# Patient Record
Sex: MALE | Race: WHITE | NOT HISPANIC OR LATINO | Employment: FULL TIME | URBAN - METROPOLITAN AREA
[De-identification: names, ages, dates, MRNs, and addresses within clinical notes are randomized per-mention and may not be internally consistent; named-entity substitution may affect disease eponyms.]

---

## 2020-10-07 ENCOUNTER — OFFICE VISIT (OUTPATIENT)
Dept: FAMILY MEDICINE | Facility: CLINIC | Age: 35
End: 2020-10-07
Payer: COMMERCIAL

## 2020-10-07 VITALS
WEIGHT: 184 LBS | HEART RATE: 58 BPM | SYSTOLIC BLOOD PRESSURE: 128 MMHG | DIASTOLIC BLOOD PRESSURE: 83 MMHG | TEMPERATURE: 98.4 F | RESPIRATION RATE: 16 BRPM | BODY MASS INDEX: 26.34 KG/M2 | OXYGEN SATURATION: 98 % | HEIGHT: 70 IN

## 2020-10-07 DIAGNOSIS — N50.811 PAIN IN RIGHT TESTICLE: ICD-10-CM

## 2020-10-07 DIAGNOSIS — Z00.00 ROUTINE GENERAL MEDICAL EXAMINATION AT A HEALTH CARE FACILITY: Primary | ICD-10-CM

## 2020-10-07 DIAGNOSIS — K59.00 CONSTIPATION, UNSPECIFIED CONSTIPATION TYPE: ICD-10-CM

## 2020-10-07 PROCEDURE — 90686 IIV4 VACC NO PRSV 0.5 ML IM: CPT | Performed by: PHYSICIAN ASSISTANT

## 2020-10-07 PROCEDURE — 99385 PREV VISIT NEW AGE 18-39: CPT | Mod: 25 | Performed by: PHYSICIAN ASSISTANT

## 2020-10-07 PROCEDURE — 90471 IMMUNIZATION ADMIN: CPT | Performed by: PHYSICIAN ASSISTANT

## 2020-10-07 PROCEDURE — 99213 OFFICE O/P EST LOW 20 MIN: CPT | Mod: 25 | Performed by: PHYSICIAN ASSISTANT

## 2020-10-07 SDOH — HEALTH STABILITY: MENTAL HEALTH: HOW OFTEN DO YOU HAVE 6 OR MORE DRINKS ON ONE OCCASION?: NOT ASKED

## 2020-10-07 SDOH — HEALTH STABILITY: MENTAL HEALTH: HOW MANY STANDARD DRINKS CONTAINING ALCOHOL DO YOU HAVE ON A TYPICAL DAY?: NOT ASKED

## 2020-10-07 SDOH — HEALTH STABILITY: MENTAL HEALTH: HOW OFTEN DO YOU HAVE A DRINK CONTAINING ALCOHOL?: NOT ASKED

## 2020-10-07 ASSESSMENT — MIFFLIN-ST. JEOR: SCORE: 1773.36

## 2020-10-07 NOTE — PROGRESS NOTES
3  SUBJECTIVE:   CC: Manpreet Lutz is an 35 year old male who presents for preventive health visit.       Patient has been advised of split billing requirements and indicates understanding: Yes  Healthy Habits:    Do you get at least three servings of calcium containing foods daily (dairy, green leafy vegetables, etc.)? yes    Amount of exercise or daily activities, outside of work: 5-6 day(s) per week, bikes often    Problems taking medications regularly not applicable    Medication side effects: No    Have you had an eye exam in the past two years? yes    Do you see a dentist twice per year? yes    Do you have sleep apnea, excessive snoring or daytime drowsiness?no    *  Right testicular pain off and on over the last 7 months  *  Constipation, has been using miralax daily  *  Will do flu shot, labs patient is not fasting     Has had issues with constipation for a long time, strains, small amounts of stool or sometimes loose  Started miralax for a couple weeks, made a huge difference  Not sure what to do next with miralax    Testicular pain: tender to touch sometimes  No urinary issues, ED issues, discharge, lump that he knows of  Not positional      Today's PHQ-2 Score:   PHQ-2 ( 1999 [employer]) 10/7/2020   Q1: Little interest or pleasure in doing things 0   Q2: Feeling down, depressed or hopeless 0   PHQ-2 Score 0       Abuse: Current or Past(Physical, Sexual or Emotional)- No  Do you feel safe in your environment? Yes        Social History     Tobacco Use     Smoking status: Light Tobacco Smoker     Types: Cigarettes     Smokeless tobacco: Never Used     Tobacco comment: 2 cigs daily - stress. for the past year. smoked a few years when younger and quit   Substance Use Topics     Alcohol use: Yes     Comment: occasioanlly     If you drink alcohol do you typically have >3 drinks per day or >7 drinks per week? No                      Last PSA: No results found for: PSA    Reviewed orders with patient.  Reviewed health maintenance and updated orders accordingly - Yes  Labs reviewed in EPIC  BP Readings from Last 3 Encounters:   10/07/20 128/83    Wt Readings from Last 3 Encounters:   10/07/20 83.5 kg (184 lb)                  Patient Active Problem List   Diagnosis   (none) - all problems resolved or deleted     Past Surgical History:   Procedure Laterality Date     SURGICAL HISTORY OF -   11/28/2000    Appendectomy       Social History     Tobacco Use     Smoking status: Light Tobacco Smoker     Types: Cigarettes     Smokeless tobacco: Never Used     Tobacco comment: 2 cigs daily - stress. for the past year. smoked a few years when younger and quit   Substance Use Topics     Alcohol use: Yes     Comment: occasioanlly     Family History   Problem Relation Age of Onset     Coronary Artery Disease Father 55        angioplasty     Heart Disease Paternal Grandfather      Cancer No family hx of      Diabetes No family hx of            Reviewed and updated as needed this visit by clinical staff  Tobacco  Allergies  Meds  Problems  Med Hx  Surg Hx  Fam Hx  Soc Hx          Reviewed and updated as needed this visit by Provider  Tobacco   Meds  Problems  Med Hx  Surg Hx  Fam Hx         History reviewed. No pertinent past medical history.   Past Surgical History:   Procedure Laterality Date     SURGICAL HISTORY OF -   11/28/2000    Appendectomy       ROS:  CONSTITUTIONAL: NEGATIVE for fever, chills, change in weight  INTEGUMENTARY/SKIN: NEGATIVE for worrisome rashes, moles or lesions  EYES: NEGATIVE for vision changes or irritation  ENT: NEGATIVE for ear, mouth and throat problems  RESP: NEGATIVE for significant cough or SOB  CV: NEGATIVE for chest pain, palpitations or peripheral edema  GI: NEGATIVE for nausea, abdominal pain, heartburn, or change in bowel habits   male: negative for dysuria, hematuria, decreased urinary stream, erectile dysfunction, urethral discharge  MUSCULOSKELETAL: NEGATIVE for  "significant arthralgias or myalgia  NEURO: NEGATIVE for weakness, dizziness or paresthesias  PSYCHIATRIC: NEGATIVE for changes in mood or affect    OBJECTIVE:   /83   Pulse 58   Temp 98.4  F (36.9  C) (Tympanic)   Resp 16   Ht 1.79 m (5' 10.47\")   Wt 83.5 kg (184 lb)   SpO2 98%   BMI 26.05 kg/m    EXAM:  GENERAL: healthy, alert and no distress  EYES: Eyes grossly normal to inspection, PERRL and conjunctivae and sclerae normal  HENT: ear canals and TM's normal, nose and mouth without ulcers or lesions  NECK: no adenopathy, no asymmetry, masses, or scars and thyroid normal to palpation  RESP: lungs clear to auscultation - no rales, rhonchi or wheezes  CV: regular rate and rhythm, normal S1 S2, no S3 or S4, no murmur, click or rub, no peripheral edema and peripheral pulses strong  ABDOMEN: soft, nontender, no hepatosplenomegaly, no masses and bowel sounds normal   (male): normal male genitalia without lesions or urethral discharge, no hernia   (male): POSITIVE right testicle shows small mobile cyst like structure, maybe pea sized on the testicle, this is the tender area  MS: no gross musculoskeletal defects noted, no edema  SKIN: no suspicious lesions or rashes  NEURO: Normal strength and tone, mentation intact and speech normal  BACK: no CVA tenderness, no paralumbar tenderness  PSYCH: mentation appears normal, affect normal/bright  LYMPH: no cervical, supraclavicular, axillary, or inguinal adenopathy    Diagnostic Test Results:  Labs reviewed in Epic    ASSESSMENT/PLAN:     ASSESSMENT/PLAN:      ICD-10-CM    1. Routine general medical examination at a health care facility  Z00.00 INFLUENZA VACCINE IM > 6 MONTHS VALENT IIV4 [67307]     Lipid panel reflex to direct LDL Fasting     Glucose     **HIV Antigen Antibody Combo FUTURE anytime   2. Pain in right testicle  N50.811 US Testicular & Scrotum w Doppler Ltd   3. Constipation, unspecified constipation type  K59.00      We discussed if ultrasound does " "not show concern, monitor symptoms, consider pelvic floor physical therapy or urology consult if needed.  Constipation: if worsening, consider further evaluation. Discussed symptomatic measures, reasons for this.    Patient Instructions   Testicular ultrasound: For [facility]) imaging departments: call [phone] to schedule     Fasting labwork    Constipation:   For constipation:   1. Consume at least 8 glasses of water per day   2. Exercise for at least 30 minutes every day. Regular exercise helps to keep your digestive system active and and healthy and may help with constipation.   3. Increase dietary fiber. Goal of 25 grams per day for women, 35 grams per day for men. If unable to consume 25-35 grams through diet alone consider OTC supplements such as Benefiber, FiberCon, Metamucil, or Citrucel.   4. Recommend taking Miralax (17 grams = 1 scoop). Take once daily, can mix with anything. If you experience increasing bowel movements and diarrhea, decrease to every other day or every 3rd day. Miralax is an osmotic laxative that increases the amount of water secreted by the intestines resulting in softer and easier to pass stools.   Can use this as needed      Patient has been advised of split billing requirements and indicates understanding: Yes  COUNSELING:  Reviewed preventive health counseling, as reflected in patient instructions       Regular exercise       Healthy diet/nutrition       HIV screeninx in teen years, 1x in adult years, and at intervals if high risk    Estimated body mass index is 26.05 kg/m  as calculated from the following:    Height as of this encounter: 1.79 m (5' 10.47\").    Weight as of this encounter: 83.5 kg (184 lb).    Weight management plan: Discussed healthy diet and exercise guidelines    He reports that he has been smoking cigarettes. He has never used smokeless tobacco.  Not ready to quit    Counseling Resources:  ATP IV Guidelines  Pooled Cohorts " Equation Calculator  FRAX Risk Assessment  ICSI Preventive Guidelines  Dietary Guidelines for Americans, 2010  USDA's MyPlate  ASA Prophylaxis  Lung CA Screening    EDILIA Llanes [facility]

## 2020-10-07 NOTE — PROGRESS NOTES
SUBJECTIVE:                                                    Manpreet Lutz is a 35 year old male who presents to clinic today for the following health issues:    {Superlists:200467}    Problem list and histories reviewed & adjusted, as indicated.  Additional history: ***    {HIST REVIEW/ LINKS 2:328908}    ROS:  {ROS COMP:117703}    OBJECTIVE:                                                    There were no vitals taken for this visit. There is no height or weight on file to calculate BMI.   {EXAM CHOICES:098058}       ASSESSMENT/PLAN:                                                      No diagnosis found.   has no history on file for tobacco.  {Tobacco Cessation needed for ACO -- Delete if patient is a non-smoker:770142}    {Weight Management Plan needed for ACO:373562}    ***  [facility]

## 2020-10-07 NOTE — PATIENT INSTRUCTIONS
Testicular ultrasound: For [facility] ([place], [place], [facility]) imaging departments: call [phone] to schedule     Fasting labwork    Constipation:   For constipation:   1. Consume at least 8 glasses of water per day   2. Exercise for at least 30 minutes every day. Regular exercise helps to keep your digestive system active and and healthy and may help with constipation.   3. Increase dietary fiber. Goal of 25 grams per day for women, 35 grams per day for men. If unable to consume 25-35 grams through diet alone consider OTC supplements such as Benefiber, FiberCon, Metamucil, or Citrucel.   4. Recommend taking Miralax (17 grams = 1 scoop). Take once daily, can mix with anything. If you experience increasing bowel movements and diarrhea, decrease to every other day or every 3rd day. Miralax is an osmotic laxative that increases the amount of water secreted by the intestines resulting in softer and easier to pass stools.   Can use this as needed    Preventive Health Recommendations  Male Ages 26 - 39    Yearly exam:             See your health care provider every year in order to  o   Review health changes.   o   Discuss preventive care.    o   Review your medicines if your doctor has prescribed any.    You should be tested each year for STDs (sexually transmitted diseases), if you re at risk.     After age 35, talk to your provider about cholesterol testing. If you are at risk for heart disease, have your cholesterol tested at least every 5 years.     If you are at risk for diabetes, you should have a diabetes test (fasting glucose).  Shots: Get a flu shot each year. Get a tetanus shot every 10 years.     Nutrition:    Eat at least 5 servings of fruits and vegetables daily.     Eat whole-grain bread, whole-wheat pasta and brown rice instead of white grains and rice.     Get adequate Calcium and Vitamin D.     Lifestyle    Exercise for at least 150 minutes a week (30 minutes a day, 5 days a week). This will  help you control your weight and prevent disease.     Limit alcohol to one drink per day.     No smoking.     Wear sunscreen to prevent skin cancer.     See your dentist every six months for an exam and cleaning.

## 2021-04-14 ENCOUNTER — OFFICE VISIT (OUTPATIENT)
Dept: FAMILY MEDICINE | Facility: CLINIC | Age: 36
End: 2021-04-14
Payer: COMMERCIAL

## 2021-04-14 ENCOUNTER — ANCILLARY PROCEDURE (OUTPATIENT)
Dept: GENERAL RADIOLOGY | Facility: CLINIC | Age: 36
End: 2021-04-14
Attending: PHYSICIAN ASSISTANT
Payer: COMMERCIAL

## 2021-04-14 VITALS
BODY MASS INDEX: 27.39 KG/M2 | SYSTOLIC BLOOD PRESSURE: 116 MMHG | DIASTOLIC BLOOD PRESSURE: 70 MMHG | WEIGHT: 191.3 LBS | HEIGHT: 70 IN | OXYGEN SATURATION: 98 % | HEART RATE: 67 BPM | TEMPERATURE: 98.3 F | RESPIRATION RATE: 15 BRPM

## 2021-04-14 DIAGNOSIS — S93.401A SPRAIN OF RIGHT ANKLE, UNSPECIFIED LIGAMENT, INITIAL ENCOUNTER: Primary | ICD-10-CM

## 2021-04-14 DIAGNOSIS — M79.671 RIGHT FOOT PAIN: ICD-10-CM

## 2021-04-14 DIAGNOSIS — S93.401A SPRAIN OF RIGHT ANKLE, UNSPECIFIED LIGAMENT, INITIAL ENCOUNTER: ICD-10-CM

## 2021-04-14 PROCEDURE — 73630 X-RAY EXAM OF FOOT: CPT | Mod: RT | Performed by: RADIOLOGY

## 2021-04-14 PROCEDURE — 73610 X-RAY EXAM OF ANKLE: CPT | Mod: RT | Performed by: RADIOLOGY

## 2021-04-14 PROCEDURE — 99213 OFFICE O/P EST LOW 20 MIN: CPT | Performed by: PHYSICIAN ASSISTANT

## 2021-04-14 ASSESSMENT — MIFFLIN-ST. JEOR: SCORE: 1801.47

## 2021-04-14 NOTE — PROGRESS NOTES
Assessment & Plan     ASSESSMENT/PLAN:      ICD-10-CM    1. Sprain of right ankle, unspecified ligament, initial encounter  S93.401A XR Ankle Right G/E 3 Views     [facility]   2. Right foot pain  M79.671 XR Foot Right G/E 3 Views     [facility]     Ankle stretches handouts.  Recommended start with physical therapy  Consider MRI/ortho follow up as needed.    Return in about 5 days (around 4/19/2021) for PT.    EDILIA Llanes [facility] E[facility] Gil is a 36 year old who presents for the following health issues     HPI   Chief Complaint   Patient presents with     Ankle Pain     right ankle sprain 3 months ago and still bothering him, states that recently it has been progressively getting worse. States that he sometimes slips down his stairs. No known injury that he is aware of that happened it has just been aggravated over time       Musculoskeletal problem/pain  Onset/Duration: Reoccurring ankle sprain, right foot pain  Description  Location: Right ankle - right  Joint Swelling: YES  Redness: no  Pain: YES  Warmth: no  Intensity:  moderate  Progression of Symptoms:  worsening  Accompanying signs and symptoms:   Fevers: no  Numbness/tingling/weakness: YES, weakness  History  Trauma to the area: None that he is aware of  Recent illness:  no  Previous similar problem: no  Previous evaluation:  no  Precipitating or alleviating factors:  Aggravating factors include: standing, walking and overuse  Therapies tried and outcome: rest/inactivity, heat, ice and Ibuprofen with some relief        About 3 months ago noticed pain on lateral foot, more when he put ski boot or hockey skates on. He slipped down slick stairs a couple times. He was also a runner at the time. Has not run for a couple months because of the pain.  Then 2 months ago he was shoveling driveway and twisted ankle, reaggravated previous soccer injuries. Over the years if he walks excessively  "etc he has ankle soreness. Has been a couple years since he had a bad sprain before now.  Pain primarily medial, anterior ankles, posterior  He would like xray       Review of Systems   Other than noted above, general, HEENT, respiratory, cardiac, MS, and gastrointestinal systems are negative.       Objective    /70   Pulse 67   Temp 98.3  F (36.8  C) (Tympanic)   Resp 15   Ht 1.79 m (5' 10.47\")   Wt 86.8 kg (191 lb 4.8 oz)   SpO2 98%   BMI 27.08 kg/m    Body mass index is 27.08 kg/m .  Physical Exam   GENERAL APPEARANCE: healthy, alert and no distress  MS: extremities normal- no gross deformities noted  ORTHO: Ankle Exam:   Knee:normal appearance  Lower leg:normal appearance, normal on palpation    ANKLE  Inspection:Swelling:none   Tender:no real TTP. Patient notes pain medial heel, medial ankle ligaments, generalized anterior heel/anterior tibialis area  Non-tender:no bony tenderness  Range of Motion:dorsiflexion:  full, plantarflexion:  full, inversion:  decreased, eversion:  full  Strength:full  Special tests:negative anterior drawer, negative talar tilt, negative valgus stress    FOOT  foot exam : Inspection Palpation:   Swelling: no swelling  Tender: no TTP. Patient notes pain around 4th metatarsal area  Non-tender:proximal 5th metatarsal, midshaft 5th metatarsal  Range of Motion:flexion of toes:  full, extension of toes  full      Results for orders placed or performed in visit on 04/14/21   XR Foot Right G/E 3 Views     Status: None    Narrative    XR ANKLE RT G/E 3 VW, XR FOOT RT G/E 3 VW   4/14/2021 10:36 AM     HISTORY:  3 months ago injury, recurrent injuries, pain medial side of  ankle/anterior ankle; Sprain of right ankle, unspecified ligament,  initial encounter      Impression    IMPRESSION: Unremarkable exam.    RADHA TOBAR MD   Results for orders placed or performed in visit on 04/14/21   XR Ankle Right G/E 3 Views     Status: None    Narrative    XR ANKLE RT G/E 3 VW, XR FOOT RT G/E 3 " VW   4/14/2021 10:36 AM     HISTORY:  3 months ago injury, recurrent injuries, pain medial side of  ankle/anterior ankle; Sprain of right ankle, unspecified ligament,  initial encounter      Impression    IMPRESSION: Unremarkable exam.    RADHA TOBAR MD

## 2021-04-14 NOTE — PATIENT INSTRUCTIONS
Patient Education     Foot and Ankle Exercises: Ankle Circles    This exercise is designed to stretch and strengthen your feet and ankles by using range of motion. Before beginning the exercise, read through all the instructions. While exercising, breathe normally. If you feel any pain, stop the exercise. If pain continues, tell your healthcare provider.     Sit straight-legged on the floor or other firm surface.    Resting your ______ calf on a rolled-up towel, use your foot to draw circles in both directions. Or leading with your big toe, write the letters of the alphabet in the air.    Continue for ______ seconds. Do ______ times a day.  [employer] last reviewed this educational content on 4/1/2020 2000-2021 [employer]. All rights reserved. This information is not intended as a substitute for professional medical care. Always follow your healthcare professional's instructions.           Patient Education     Bent-Knee Calf Stretch    This exercise is designed to stretch and strengthen your feet and ankles. Before beginning the exercise, read through all the instructions. While exercising, breathe normally and don t bounce. If you feel any pain, stop the exercise. If pain persists, inform your healthcare provider:    Stand an arm s length away from a wall. Place the palms of your hands on the wall. Step forward about 12 inches with your ______ foot.    Keeping toes pointed forward and both heels on the floor, bend both knees and lean forward. Hold for ______ seconds. Relax.    Repeat ______ times. Do ______ sets a day.  [employer] last reviewed this educational content on 3/1/2018    [id number] [employer]. All rights reserved. This information is not intended as a substitute for professional medical care. Always follow your healthcare professional's instructions.           Patient Education     Foot and Ankle Exercises: Single-Leg Heel Raise    This exercise is designed to stretch and  strengthen your feet and ankles. Before beginning the exercise, read through all the instructions. While exercising, breathe normally and don t bounce. If you feel any pain, stop the exercise. If pain persists, inform your healthcare provider. If you recently had joint surgery, talk with your provider before doing this exercise. Here are the steps to the single-leg heel raise:     Stand, using something sturdy like a wall or a counter for balance only. Lift 1 foot and stand with your weight on the other foot and your knee straight.    Rise up on your toes, hold for 5 seconds, and then slowly lower back onto your heel.    Repeat 10 times. Do 3 sets a day.  [facility] last reviewed this educational content on 6/1/2020 2000-2021 [employer]. All rights reserved. This information is not intended as a substitute for professional medical care. Always follow your healthcare professional's instructions.           Patient Education     Ankle Dorsiflexion (Strength)                                   The steps below are for your right ankle. Switch sides for your left ankle.  1. Tie an elastic exercise band or tubing to the bottom part of a table. Tie the other end to your right foot.  2. Sit on the floor with your right leg straight. Sit far enough away from the table so that the elastic band or tubing is pulled tight between your foot and the table leg.  3. Slowly flex your right foot and pull your toes back toward you. Keep your right leg straight. Hold for 5 seconds.  4. Relax your foot.  5. Repeat this exercise 10 times.  [facility] last reviewed this educational content on 6/1/2019 2000-2021 [employer]. All rights reserved. This information is not intended as a substitute for professional medical care. Always follow your healthcare professional's instructions.           Patient Education     Ankle Dorsiflexion/Plantarflexion (Flexibility)    6. Sit on the floor or in bed with your legs  straight out in front of you.  7. Point both feet. Then flex both feet.  8. Do this 10 to 30 times in a row.  9. Repeat this exercise 2 times a day, or as instructed.  [employer] last reviewed this educational content on 6/1/2019 2000-2021 [employer]. All rights reserved. This information is not intended as a substitute for professional medical care. Always follow your healthcare professional's instructions.           Patient Education     Ankle Alphabet (Flexibility)    These instructions are for your right foot. Switch sides for your left foot.  10. Sit on the floor with your legs straight in front of you.  11. Rest your right calf on a rolled-up towel. Use your foot to write the letters of the alphabet in mid-air.  12. Repeat this exercise 3 times a day, or as instructed.  [employer] last reviewed this educational content on 2/1/2020 2000-2021 [employer]. All rights reserved. This information is not intended as a substitute for professional medical care. Always follow your healthcare professional's instructions.

## 2021-04-19 ENCOUNTER — THERAPY VISIT (OUTPATIENT)
Dept: PHYSICAL THERAPY | Facility: CLINIC | Age: 36
End: 2021-04-19
Payer: COMMERCIAL

## 2021-04-19 DIAGNOSIS — S93.401A SPRAIN OF RIGHT ANKLE, UNSPECIFIED LIGAMENT, INITIAL ENCOUNTER: ICD-10-CM

## 2021-04-19 DIAGNOSIS — M79.671 RIGHT FOOT PAIN: ICD-10-CM

## 2021-04-19 PROCEDURE — 97110 THERAPEUTIC EXERCISES: CPT | Mod: GP | Performed by: PHYSICAL THERAPIST

## 2021-04-19 PROCEDURE — 97112 NEUROMUSCULAR REEDUCATION: CPT | Mod: GP | Performed by: PHYSICAL THERAPIST

## 2021-04-19 PROCEDURE — 97161 PT EVAL LOW COMPLEX 20 MIN: CPT | Mod: GP | Performed by: PHYSICAL THERAPIST

## 2021-04-20 NOTE — PROGRESS NOTES
Physical Therapy Initial Evaluation  Subjective:    Patient Health History  Manpreet Lutz being seen for Ankle injury.     Problem began: 1/20/2021.   Problem occurred: Not known   Pain is reported as 3/10 on pain scale.  General health as reported by patient is good.       Medical allergies: none.   Surgeries include:  Other. Other surgery history details: Appendix removal.    Current medications:  None.    Current occupation is .   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                  Therapist Generated HPI Evaluation  Problem details: Jeffery reports an old sprain medial right ankle that has cause recurring pain but over the past few months has also had lateral foot pain, worse with being in ski boot or skate, and not feeling like he is able to run without ankle pain..         Type of problem:  Right ankle and right foot.    This is a recurrent condition.  Condition occurred with:  Insidious onset.  Where condition occurred: during recreation/sport and for unknown reasons.  Patient reports pain:  Medial and other (lateral foot).  Pain is described as aching and is intermittent.  Pain radiates to:  No radiation. Pain is worse during the day.  Since onset symptoms are gradually worsening.  Associated symptoms:  Loss of motion/stiffness and loss of strength. Symptoms are exacerbated by running, certain positions and bending/squatting    Special tests included:  X-ray.                            Objective:  System    Ankle/Foot Evaluation  ROM:  AROM is normal (Right ankle active inv/ev feels more difficutl to do than left).PROM is normal.    PROM:                Pain: mild anterior pain with weight bearing dorsiflexion motion    Strength:    Dorsiflexion:  Right: 5/5   Pain:  Plantarflexion: Right: 5/5  Pain:  Inversion:Right: 5/5  Pain:  Eversion:Right: 5/5  Pain:                      PALPATION:     Right ankle tenderness present at:    "deltoid ligament      FUNCTIONAL TESTS:   Anterior-Lateral Reach: Left Leg/Right Arm: 2'6\"     Right Leg/Left Arm: 2'6\"        Quad:  Single Leg Squat Left: 4\" step down  Control is normal control.  Single Leg Squat Right: 4\" step down Control is hip substitution      Proprioception:  Stork Balance Test: Left: 30 sec good  Right: 30 sec good, a little more unstable than left on compliant surface                                                     General     ROS    Assessment/Plan:    Patient is a 36 year old male with right side ankle complaints.    Patient has the following significant findings with corresponding treatment plan.                Diagnosis 1:  Ankle pain  Pain -  manual therapy  Decreased joint mobility - manual therapy and therapeutic exercise  Decreased proprioception - neuro re-education and therapeutic activities    Therapy Evaluation Codes:   1) History comprised of:   Personal factors that impact the plan of care:      None.    Comorbidity factors that impact the plan of care are:      None.     Medications impacting care: None.  2) Examination of Body Systems comprised of:   Body structures and functions that impact the plan of care:      Ankle.   Activity limitations that impact the plan of care are:      Running, Sports and Squatting/kneeling.  3) Clinical presentation characteristics are:   Evolving/Changing.  4) Decision-Making    Low complexity using standardized patient assessment instrument and/or measureable assessment of functional outcome.  Cumulative Therapy Evaluation is: Low complexity.    Previous and current functional limitations:  (See Goal Flow Sheet for this information)    Short term and Long term goals: (See Goal Flow Sheet for this information)     Communication ability:  Patient appears to be able to clearly communicate and understand verbal and written communication and follow directions correctly.  Treatment Explanation - The following has been discussed with the " patient:   RX ordered/plan of care  Anticipated outcomes  Possible risks and side effects  This patient would benefit from PT intervention to resume normal activities.   Rehab potential is excellent.    Frequency:  1 X week, once daily  Duration:  for 6 weeks  Discharge Plan:  Achieve all LTG.  Independent in home treatment program.  Reach maximal therapeutic benefit.    Please refer to the daily flowsheet for treatment today, total treatment time and time spent performing 1:1 timed codes.

## 2021-04-30 ENCOUNTER — THERAPY VISIT (OUTPATIENT)
Dept: PHYSICAL THERAPY | Facility: CLINIC | Age: 36
End: 2021-04-30
Payer: COMMERCIAL

## 2021-04-30 DIAGNOSIS — S93.401A SPRAIN OF RIGHT ANKLE, UNSPECIFIED LIGAMENT, INITIAL ENCOUNTER: Primary | ICD-10-CM

## 2021-04-30 DIAGNOSIS — M79.671 RIGHT FOOT PAIN: ICD-10-CM

## 2021-04-30 PROCEDURE — 97140 MANUAL THERAPY 1/> REGIONS: CPT | Mod: GP | Performed by: PHYSICAL THERAPIST

## 2021-04-30 PROCEDURE — 97110 THERAPEUTIC EXERCISES: CPT | Mod: GP | Performed by: PHYSICAL THERAPIST

## 2021-04-30 PROCEDURE — 97112 NEUROMUSCULAR REEDUCATION: CPT | Mod: GP | Performed by: PHYSICAL THERAPIST

## 2021-07-21 ENCOUNTER — OFFICE VISIT (OUTPATIENT)
Dept: URGENT CARE | Facility: URGENT CARE | Age: 36
End: 2021-07-21
Payer: COMMERCIAL

## 2021-07-21 VITALS
RESPIRATION RATE: 16 BRPM | SYSTOLIC BLOOD PRESSURE: 121 MMHG | BODY MASS INDEX: 26.95 KG/M2 | OXYGEN SATURATION: 98 % | WEIGHT: 190.4 LBS | DIASTOLIC BLOOD PRESSURE: 65 MMHG | HEART RATE: 50 BPM | TEMPERATURE: 97.4 F
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
## 2021-07-21 DIAGNOSIS — R21 RASH AND NONSPECIFIC SKIN ERUPTION: ICD-10-CM

## 2021-07-21 DIAGNOSIS — S90.861A TICK BITE OF FOOT, RIGHT, INITIAL ENCOUNTER: Primary | ICD-10-CM

## 2021-07-21 PROCEDURE — 99213 OFFICE O/P EST LOW 20 MIN: CPT | Performed by: PHYSICIAN ASSISTANT

## 2021-07-21 RX ORDER — DOXYCYCLINE 100 MG/1
100 CAPSULE ORAL 2 TIMES DAILY WITH MEALS
Qty: 42 CAPSULE | Refills: 0 | Status: SHIPPED | OUTPATIENT
Start: 2021-07-21 | End: 2021-08-11

## 2021-07-21 ASSESSMENT — ENCOUNTER SYMPTOMS
COLOR CHANGE: 1
CARDIOVASCULAR NEGATIVE: 1
FATIGUE: 0
WOUND: 0
COUGH: 0
PALPITATIONS: 0
FEVER: 0
RESPIRATORY NEGATIVE: 1
CHILLS: 0
SHORTNESS OF BREATH: 0
CHEST TIGHTNESS: 0
RHINORRHEA: 0
SORE THROAT: 0
WHEEZING: 0

## 2021-07-21 ASSESSMENT — PAIN SCALES - GENERAL: PAINLEVEL: NO PAIN (0)

## 2021-07-21 NOTE — PROGRESS NOTES
Subjective   Jeffery is a 36 year old who presents for the following health issues   HPI   Concern - tick bite  Onset: 2days   Description: Noticed tick bite over the top of the R foot yesterday which he had removed and now has a circular ring present with bruising.  No pain or itching.   Intensity: mild  Progression of Symptoms:  same  Accompanying Signs & Symptoms:  No flu like symptoms, bodyaches, fatigue or fevers.  No new soaps/lotions/detergent, no new medications or foods.  No one else in the family with similar rashes.   Previous history of similar problem: none  Precipitating factors:        Worsened by: none  Alleviating factors:        Improved by: none  Therapies tried and outcome: removal of tick with good relief    Patient Active Problem List   Diagnosis   (none) - all problems resolved or deleted     No current outpatient medications on file.     No current facility-administered medications for this visit.      No Known Allergies    Review of Systems   Constitutional: Negative for chills, fatigue and fever.   HENT: Negative.  Negative for congestion, ear pain, rhinorrhea and sore throat.    Respiratory: Negative.  Negative for cough, chest tightness, shortness of breath and wheezing.    Cardiovascular: Negative.  Negative for chest pain, palpitations and peripheral edema.   Skin: Positive for color change and rash. Negative for pallor and wound.   All other systems reviewed and are negative.           Objective    /65   Pulse 50   Temp 97.4  F (36.3  C) (Tympanic)   Resp 16   Wt 86.4 kg (190 lb 6.4 oz)   SpO2 98%   BMI 26.95 kg/m    Body mass index is 26.95 kg/m .  Physical Exam  Vitals and nursing note reviewed.   Constitutional:       General: He is not in acute distress.     Appearance: Normal appearance. He is normal weight. He is not ill-appearing.   Skin:     General: Skin is warm and dry.      Capillary Refill: Capillary refill takes less than 2 seconds.      Findings: Bruising and  lesion (hyperpigmented annular lesion with bruising present measuring 1.5cm X1.5cm present over the dorsal R foot.  no tenderness, erythema or discharge present.   ) present. No abrasion, abscess, erythema, petechiae, rash or wound.   Neurological:      Mental Status: He is alert and oriented to person, place, and time.   Psychiatric:         Mood and Affect: Mood normal.         Behavior: Behavior normal.         Thought Content: Thought content normal.         Judgment: Judgment normal.            Assessment/Plan:  Tick bite of foot, right, initial encounter:  Will empirically treat for lymes with doxycycline [id number] due to presence of a rash.  Early infection may produce false negative lymes test.  Discussed risks and benefits of medication along with side effects, direction for use, and sun sensitivity and sunburns.  Recommend prevention with proper clothing and bug spray with deet.  Recheck in clinic if he develops flu like symptoms, fatigue, bodyaches, stiff neck, palpitations, dizziness.   -     doxycycline monohydrate (MONODOX) 100 MG capsule; Take 1 capsule (100 mg) by mouth 2 times daily (with meals) for 21 days Increases risk of heartburn and also sun sensitivity or sun burn. Contraindicated in pregnancy.    Rash and nonspecific skin eruption        Ramandeep Daniels PA-C

## 2021-08-09 ENCOUNTER — OFFICE VISIT (OUTPATIENT)
Dept: FAMILY MEDICINE | Facility: CLINIC | Age: 36
End: 2021-08-09
Payer: COMMERCIAL

## 2021-08-09 VITALS
HEART RATE: 53 BPM | SYSTOLIC BLOOD PRESSURE: 125 MMHG | BODY MASS INDEX: 26.87 KG/M2 | HEIGHT: 70 IN | RESPIRATION RATE: 16 BRPM | TEMPERATURE: 97 F | DIASTOLIC BLOOD PRESSURE: 79 MMHG | WEIGHT: 187.7 LBS

## 2021-08-09 DIAGNOSIS — Z30.2 ENCOUNTER FOR STERILIZATION: Primary | ICD-10-CM

## 2021-08-09 PROCEDURE — 99212 OFFICE O/P EST SF 10 MIN: CPT | Performed by: FAMILY MEDICINE

## 2021-08-09 ASSESSMENT — MIFFLIN-ST. JEOR: SCORE: 1785.11

## 2021-08-09 ASSESSMENT — PAIN SCALES - GENERAL: PAINLEVEL: NO PAIN (0)

## 2021-08-09 NOTE — PROGRESS NOTES
"SUBJECTIVE:                                                    Manpreet Lutz is a 36 year old male who presents to clinic today for the following health issues:    Chief Complaint   Patient presents with     Consult     for vasectomy     Problem list and histories reviewed & adjusted, as indicated.  Additional history:     Patient Active Problem List   Diagnosis   (none) - all problems resolved or deleted     Past Surgical History:   Procedure Laterality Date     SURGICAL HISTORY OF -   11/28/2000    Appendectomy       Social History     Tobacco Use     Smoking status: Light Tobacco Smoker     Types: Cigarettes     Smokeless tobacco: Never Used     Tobacco comment: 2 cigs daily - stress. for the past year. smoked a few years when younger and quit   Substance Use Topics     Alcohol use: Yes     Comment: occasioanlly     Family History   Problem Relation Age of Onset     Coronary Artery Disease Father 55        angioplasty     Heart Disease Paternal Grandfather      Cancer No family hx of      Diabetes No family hx of            ROS:  Constitutional, HEENT, cardiovascular, pulmonary, gi and gu systems are negative, except as otherwise noted.    OBJECTIVE:                                                    /79   Pulse 53   Temp 97  F (36.1  C) (Tympanic)   Resp 16   Ht 1.79 m (5' 10.47\")   Wt 85.1 kg (187 lb 11.2 oz)   BMI 26.57 kg/m   Body mass index is 26.57 kg/m .   GENERAL: healthy, alert, well nourished, well hydrated, no distress  SUBJECTIVE:  maurice comes in for a vasectomy consultation.  He and his partner have decided they don't want to have any more children. They have decided that he will have the sterilization procedure instead of her.  Patient was given information to read prior to the consultation.      We talked about the risks and benefits of the vasectomy; risks being that of potential for bleeding, infection, postoperative discomfort immediately which can last for a number of weeks " afterwards, also the development of spermatoceles or sperm granulomas, epididymal cysts and the possibility of failure of the procedure producing failed attempt at sterility.     Also mentioned to the patient that there is some literature out there that suggests men who have vasectomies are at increased risk for prostate cancer; however, this literature is inconclusive and controversial.  It is recommended that men who have vasectomies should have annual prostate exams through the rectum yearly after age 40 and also may benefit from a screening prostatic specific antigen test after age 40.  We also discussed signs and symptoms of prostatic enlargement or prostatic problems.     I went through the procedure with the patient, how it is done, a midline incision in the scrotum measuring approximately 1/2 inch in length.  The vas deferens is brought up through this incision, dissected free and a small portion, maybe 0.5 cm. in length is removed.   cauterized and then the proximal or distal end is buried away from the other.  Patient had no questions when it came to the procedure itself.  I did show him pictures and diagrams of the procedure.  I showed him where a potential spermatocele or sperm granuloma can occur.      Again, the patient had no further questions for me.    OBJECTIVE:  On exam, this is a well-developed, well-nourished white male.      Exam reveals a normal circumcised penis, no lesions.  Testes are descended bilaterally.  Exam was limited to the genitalia.  Both vas deferens were easily identified.  No other abnormalities were noted.      ASSESSMENT:  Undesired fertility in an adult male, requesting vasectomy.    PLAN:  He will schedule a vasectomy at his convenience for either the last appointment of the morning He is aware that he will need to take the entire weekend off and have essentially bedrest for two days with ice packs every two hours and ibuprofen for discomfort.  I gave him a prescription for  ibuprofen 800 mg. to take every six to eight hours with food after the procedure.  He will take one tablet half an hour before the procedure along with Valium 10 mg.    If he has any further questions, he will contact me prior to the procedure or ask me on the day of the procedure.  He also is aware that he will need to bring a specimen after 10-12 ejaculations to make sure that he has cleared the storage vesicles of any residual sperm and to make sure that he is sterile.          [facility]

## 2021-09-12 ENCOUNTER — HEALTH MAINTENANCE LETTER (OUTPATIENT)
Age: 36
End: 2021-09-12

## 2021-09-13 ENCOUNTER — TELEPHONE (OUTPATIENT)
Dept: FAMILY MEDICINE | Facility: CLINIC | Age: 36
End: 2021-09-13

## 2021-09-13 DIAGNOSIS — Z30.2 ENCOUNTER FOR STERILIZATION: Primary | ICD-10-CM

## 2021-09-13 RX ORDER — OXYCODONE AND ACETAMINOPHEN 5; 325 MG/1; MG/1
1 TABLET ORAL EVERY 6 HOURS PRN
Qty: 12 TABLET | Refills: 0 | Status: SHIPPED | OUTPATIENT
Start: 2021-09-13 | End: 2021-09-16

## 2021-09-13 RX ORDER — DIAZEPAM 10 MG
TABLET ORAL
Qty: 1 TABLET | Refills: 0 | Status: SHIPPED | OUTPATIENT
Start: 2021-09-13 | End: 2023-02-22

## 2021-09-13 NOTE — TELEPHONE ENCOUNTER
Reason for Call:  Other call back    Detailed comments: he was wondering, at his last appt, you said you gave him a prescription, he did not receive one and the pharmarcy hasn't gotten the/a prescription, prescription for ibuprofen 800 mg and valium 10 mg    Phone Number Patient can be reached at: Cell number on file:    Telephone Information:   Mobile [phone]       Best Time: anytime     Can we leave a detailed message on this number? YES    Call taken on 9/13/2021 at 2:03 PM by Dot Bergman

## 2021-09-15 RX ORDER — IBUPROFEN 800 MG/1
800 TABLET, FILM COATED ORAL EVERY 8 HOURS PRN
Qty: 90 TABLET | Refills: 6 | Status: SHIPPED | OUTPATIENT
Start: 2021-09-15 | End: 2023-02-22

## 2021-09-16 ENCOUNTER — OFFICE VISIT (OUTPATIENT)
Dept: FAMILY MEDICINE | Facility: CLINIC | Age: 36
End: 2021-09-16
Payer: COMMERCIAL

## 2021-09-16 VITALS
BODY MASS INDEX: 26.48 KG/M2 | WEIGHT: 185 LBS | SYSTOLIC BLOOD PRESSURE: 121 MMHG | HEIGHT: 70 IN | RESPIRATION RATE: 16 BRPM | OXYGEN SATURATION: 99 % | HEART RATE: 68 BPM | DIASTOLIC BLOOD PRESSURE: 73 MMHG

## 2021-09-16 DIAGNOSIS — Z30.2 ENCOUNTER FOR STERILIZATION: Primary | ICD-10-CM

## 2021-09-16 PROCEDURE — 99207 PR NO CHARGE LOS: CPT | Performed by: FAMILY MEDICINE

## 2021-09-16 PROCEDURE — 55250 REMOVAL OF SPERM DUCT(S): CPT | Performed by: FAMILY MEDICINE

## 2021-09-16 ASSESSMENT — MIFFLIN-ST. JEOR: SCORE: 1772.86

## 2021-09-16 NOTE — PROGRESS NOTES
"    Assessment & Plan        Tobacco Cessation:   reports that he has been smoking cigarettes. He has never used smokeless tobacco.  Tobacco Cessation Action Plan: Information offered: Patient not interested at this time    BMI:   Estimated body mass index is 26.19 kg/m  as calculated from the following:    Height as of this encounter: 1.79 m (5' 10.47\").    Weight as of this encounter: 83.9 kg (185 lb).   Weight management plan: Discussed healthy diet and exercise guidelines      Davy Torres MD  [facility] EAMON Gil is a 36 year old who presents for the following health issues  accompanied by his father:    HPI     Review of Systems   Constitutional, HEENT, cardiovascular, pulmonary, gi and gu systems are negative, except as otherwise noted.      Objective    /73   Pulse 68   Resp 16   Ht 1.79 m (5' 10.47\")   Wt 83.9 kg (185 lb)   SpO2 99%   BMI 26.19 kg/m    Body mass index is 26.19 kg/m .            Manpreet Lutz is a 36 year old male here for vasectomy.     He has been in previously for vasectomy consult in which we discussed the no-scapel procedure, including risks and benefits, and other options of birth control.  All questions have been answered and the consent form is signed.         Procedure:  He was placed in the supine position on the procedure table.  He was prepped and draped in the usual sterile fashion.  The vas was identified and brought up to the surface.  The skin overlying the vas was anesthetized with lidocaine and further lidocaine injected into the vas sheath.  The no-scaple vas clamp was used to grasp the vas and the dissecting instrument was used to puncture the skin and dissect out the vas free of tissue. A segment of the vas was removed, both ends were burned and clesedwith intralumen cautery.  The distal end was buried under fascia.  The procedure was repeated for the other vas.  There was no complications.  He tolerated the procedure " well.  The scrotum was cleaned free of betadine and bacitracin ointment was applied over the skin incisions.         A:  Vasectomy         P:  He was given post-vasectomy instructions, including   containers for post vas specimens.  He should apply ice   and wear support for the next 2-3 days.  He should  abstain from sexual intercourse and any heavy lifting for the next week.  Call or return to clinic for any  problems.  The excised specimens were sent for pathology.  He may use Ibuprofen 800 mg po TID prn for  pain and was given a prescription for oxycodone.  He is not  considered sterile until follow up vas specimens are free of semen.

## 2021-11-07 ENCOUNTER — HEALTH MAINTENANCE LETTER (OUTPATIENT)
Age: 36
End: 2021-11-07

## 2021-12-02 ENCOUNTER — LAB (OUTPATIENT)
Dept: LAB | Facility: CLINIC | Age: 36
End: 2021-12-02
Payer: COMMERCIAL

## 2021-12-02 DIAGNOSIS — Z30.2 ENCOUNTER FOR STERILIZATION: ICD-10-CM

## 2021-12-02 LAB — SEMEN ANALYSIS P VAS PNL: NORMAL

## 2021-12-02 PROCEDURE — 89321 SEMEN ANAL SPERM DETECTION: CPT

## 2022-05-09 ENCOUNTER — LAB (OUTPATIENT)
Dept: LAB | Facility: CLINIC | Age: 37
End: 2022-05-09
Payer: COMMERCIAL

## 2022-05-09 ENCOUNTER — TELEPHONE (OUTPATIENT)
Dept: FAMILY MEDICINE | Facility: CLINIC | Age: 37
End: 2022-05-09

## 2022-05-09 DIAGNOSIS — Z30.2 ENCOUNTER FOR STERILIZATION: ICD-10-CM

## 2022-05-09 DIAGNOSIS — Z30.2 ENCOUNTER FOR STERILIZATION: Primary | ICD-10-CM

## 2022-05-09 LAB — SEMEN ANALYSIS P VAS PNL: NORMAL

## 2022-05-09 PROCEDURE — 89321 SEMEN ANAL SPERM DETECTION: CPT

## 2022-11-19 ENCOUNTER — HEALTH MAINTENANCE LETTER (OUTPATIENT)
Age: 37
End: 2022-11-19

## 2023-02-22 ENCOUNTER — OFFICE VISIT (OUTPATIENT)
Dept: FAMILY MEDICINE | Facility: CLINIC | Age: 38
End: 2023-02-22
Payer: COMMERCIAL

## 2023-02-22 VITALS
DIASTOLIC BLOOD PRESSURE: 72 MMHG | HEART RATE: 58 BPM | WEIGHT: 195.2 LBS | RESPIRATION RATE: 16 BRPM | HEIGHT: 70 IN | SYSTOLIC BLOOD PRESSURE: 110 MMHG | OXYGEN SATURATION: 98 % | BODY MASS INDEX: 27.94 KG/M2 | TEMPERATURE: 97.4 F

## 2023-02-22 DIAGNOSIS — Z11.4 SCREENING FOR HIV (HUMAN IMMUNODEFICIENCY VIRUS): ICD-10-CM

## 2023-02-22 DIAGNOSIS — R53.83 OTHER FATIGUE: ICD-10-CM

## 2023-02-22 DIAGNOSIS — Z11.59 NEED FOR HEPATITIS C SCREENING TEST: ICD-10-CM

## 2023-02-22 DIAGNOSIS — Z00.00 ROUTINE GENERAL MEDICAL EXAMINATION AT A HEALTH CARE FACILITY: Primary | ICD-10-CM

## 2023-02-22 DIAGNOSIS — Z13.220 SCREENING FOR HYPERLIPIDEMIA: ICD-10-CM

## 2023-02-22 PROCEDURE — 99396 PREV VISIT EST AGE 40-64: CPT | Performed by: PHYSICIAN ASSISTANT

## 2023-02-22 ASSESSMENT — ENCOUNTER SYMPTOMS
CONSTIPATION: 0
SORE THROAT: 0
DIZZINESS: 0
HEADACHES: 0
MYALGIAS: 0
HEMATOCHEZIA: 0
ABDOMINAL PAIN: 0
DIARRHEA: 0
FREQUENCY: 0
SHORTNESS OF BREATH: 0
NERVOUS/ANXIOUS: 0
HEMATURIA: 0
EYE PAIN: 0
PALPITATIONS: 0
CHILLS: 0
WEAKNESS: 0
JOINT SWELLING: 0
ARTHRALGIAS: 0
PARESTHESIAS: 0
COUGH: 0
HEARTBURN: 0
NAUSEA: 0
FEVER: 0
DYSURIA: 0

## 2023-02-22 NOTE — PROGRESS NOTES
SUBJECTIVE:   CC: Jeffery is an 38 year old who presents for preventative health visit.   Patient has been advised of split billing requirements and indicates understanding: Yes  Healthy Habits:     Getting at least 3 servings of Calcium per day:  Yes    Bi-annual eye exam:  NO    Dental care twice a year:  Yes    Sleep apnea or symptoms of sleep apnea:  Daytime drowsiness    Diet:  Regular (no restrictions)    Frequency of exercise:  2-3 days/week    Duration of exercise:  30-45 minutes    Taking medications regularly:  Yes    Medication side effects:  None    PHQ-2 Total Score: 0    Additional concerns today:  No    fatigue: average 6-7 hours sleep at night. He is not rested in the AM and then gets tired mid day too. Thinks likely from life/kids/etc. He does not know if he snores or not.  Dad had sleep apnea  STOP Bang score 2    Folliculitis: had recurrently in the past, saw dermatology. It is well controlled now. He thinks he had clindamycin liquid in the past. Will call if he needs it.      Today's PHQ-2 Score:   PHQ-2 (  [employer]) 2023   Q1: Little interest or pleasure in doing things 0   Q2: Feeling down, depressed or hopeless 0   PHQ-2 Score 0   PHQ-2 Total Score (12-17 Years)- Positive if 3 or more points; Administer PHQ-A if positive -   Q1: Little interest or pleasure in doing things Not at all   Q2: Feeling down, depressed or hopeless Not at all   PHQ-2 Score 0       Have you ever done Advance Care Planning? (For example, a Health Directive, POLST, or a discussion with a medical provider or your loved ones about your wishes): No, advance care planning information given to patient to review.  Patient plans to discuss their wishes with loved ones or provider.      Social History     Tobacco Use     Smoking status: Former     Packs/day: 0.25     Years: 8.00     Pack years: 2.00     Types: Cigarettes     Quit date: 2022     Years since quittin.6     Smokeless tobacco: Never     Tobacco comments:      2 cigs daily - stress. for the past year. smoked a few years when younger and quit   Substance Use Topics     Alcohol use: Yes     Comment: occasioanlly     If you drink alcohol do you typically have >3 drinks per day or >7 drinks per week? No    Alcohol Use 2023   Prescreen: >3 drinks/day or >7 drinks/week? No   Prescreen: >3 drinks/day or >7 drinks/week? -   No flowsheet data found.    Last PSA: No results found for: PSA    Reviewed orders with patient. Reviewed health maintenance and updated orders accordingly - Yes  Lab work is in process  Labs reviewed in EPIC  BP Readings from Last 3 Encounters:   23 110/72   21 121/73   21 125/79    Wt Readings from Last 3 Encounters:   23 88.5 kg (195 lb 3.2 oz)   21 83.9 kg (185 lb)   21 85.1 kg (187 lb 11.2 oz)                  Patient Active Problem List   Diagnosis   (none) - all problems resolved or deleted     Past Surgical History:   Procedure Laterality Date     APPENDECTOMY  2000       Social History     Tobacco Use     Smoking status: Former     Packs/day: 0.25     Years: 8.00     Pack years: 2.00     Types: Cigarettes     Quit date: 2022     Years since quittin.6     Smokeless tobacco: Never     Tobacco comments:     2 cigs daily - stress. for the past year. smoked a few years when younger and quit   Substance Use Topics     Alcohol use: Yes     Comment: occasioanlly     Family History   Problem Relation Age of Onset     Coronary Artery Disease Father 55        angioplasty     Sleep Apnea Father      Heart Disease Paternal Grandfather      Cancer No family hx of      Diabetes No family hx of            Reviewed and updated as needed this visit by clinical staff   Tobacco  Allergies  Meds  Problems  Med Hx  Surg Hx  Fam Hx          Reviewed and updated as needed this visit by Provider   Tobacco  Allergies  Meds  Problems  Med Hx  Surg Hx  Fam Hx         History reviewed. No pertinent past  "medical history.   Past Surgical History:   Procedure Laterality Date     APPENDECTOMY  11/28/2000       Review of Systems   Constitutional: Negative for chills and fever.   HENT: Negative for congestion, ear pain, hearing loss and sore throat.    Eyes: Negative for pain and visual disturbance.   Respiratory: Negative for cough and shortness of breath.    Cardiovascular: Negative for chest pain, palpitations and peripheral edema.   Gastrointestinal: Negative for abdominal pain, constipation, diarrhea, heartburn, hematochezia and nausea.   Genitourinary: Negative for dysuria, frequency, genital sores, hematuria, impotence, penile discharge and urgency.   Musculoskeletal: Negative for arthralgias, joint swelling and myalgias.   Skin: Negative for rash.   Neurological: Negative for dizziness, weakness, headaches and paresthesias.   Psychiatric/Behavioral: Negative for mood changes. The patient is not nervous/anxious.      OBJECTIVE:   /72   Pulse 58   Temp 97.4  F (36.3  C) (Tympanic)   Resp 16   Ht 1.786 m (5' 10.32\")   Wt 88.5 kg (195 lb 3.2 oz)   SpO2 98%   BMI 27.76 kg/m      Physical Exam  GENERAL: healthy, alert and no distress  EYES: Eyes grossly normal to inspection, PERRL and conjunctivae and sclerae normal  HENT: ear canals and TM's normal, nose and mouth without ulcers or lesions  NECK: no adenopathy, no asymmetry, masses, or scars and thyroid normal to palpation  RESP: lungs clear to auscultation - no rales, rhonchi or wheezes  CV: regular rate and rhythm, normal S1 S2, no S3 or S4, no murmur, click or rub, no peripheral edema and peripheral pulses strong  ABDOMEN: soft, nontender, no hepatosplenomegaly, no masses and bowel sounds normal  MS: no gross musculoskeletal defects noted, no edema  SKIN: no suspicious lesions or rashes  NEURO: Normal strength and tone, mentation intact and speech normal  BACK: no CVA tenderness, no paralumbar tenderness  PSYCH: mentation appears normal, affect " normal/bright  LYMPH: no cervical, supraclavicular, axillary, or inguinal adenopathy  Neck circumference 40 cm    Diagnostic Test Results:  Labs reviewed in Epic    ASSESSMENT/PLAN:     ASSESSMENT/PLAN:      ICD-10-CM    1. Routine general medical examination at a health care facility  Z00.00       2. Other fatigue  R53.83 Comprehensive metabolic panel (BMP + Alb, Alk Phos, ALT, AST, Total. Bili, TP)     TSH with free T4 reflex     CBC with platelets and differential      3. Screening for HIV (human immunodeficiency virus)  Z11.4 HIV Antigen Antibody Combo      4. Need for hepatitis C screening test  Z11.59 Hepatitis C Screen Reflex to HCV RNA Quant and Genotype      5. Screening for hyperlipidemia  Z13.220 Lipid panel reflex to direct LDL Fasting        Recommended return for fasting labwork. Will check a few labs for fatigue. Patient will try to record himself at night to see if he does snore. Recommended increase total sleep time.            COUNSELING:   Reviewed preventive health counseling, as reflected in patient instructions       Regular exercise       Healthy diet/nutrition       Immunizations    We discussed hepatitis B immunization, patient is low risk       Consider Hep C screening for all patients one time for ages 18-79 years       HIV screeninx in teen years, 1x in adult years, and at intervals if high risk       Colorectal cancer screening       Prostate cancer screening        He reports that he quit smoking about 7 months ago. His smoking use included cigarettes. He has a 2.00 pack-year smoking history. He has never used smokeless tobacco.  Nicotine/Tobacco Cessation Plan:           EDILIA Llanes [facility]

## 2023-03-24 ENCOUNTER — LAB (OUTPATIENT)
Dept: LAB | Facility: CLINIC | Age: 38
End: 2023-03-24
Payer: COMMERCIAL

## 2023-03-24 ENCOUNTER — E-VISIT (OUTPATIENT)
Dept: FAMILY MEDICINE | Facility: CLINIC | Age: 38
End: 2023-03-24
Payer: COMMERCIAL

## 2023-03-24 DIAGNOSIS — R21 RASH AND NONSPECIFIC SKIN ERUPTION: Primary | ICD-10-CM

## 2023-03-24 DIAGNOSIS — Z11.4 SCREENING FOR HIV (HUMAN IMMUNODEFICIENCY VIRUS): ICD-10-CM

## 2023-03-24 DIAGNOSIS — Z13.220 SCREENING FOR HYPERLIPIDEMIA: ICD-10-CM

## 2023-03-24 DIAGNOSIS — Z11.59 NEED FOR HEPATITIS C SCREENING TEST: ICD-10-CM

## 2023-03-24 DIAGNOSIS — R53.83 OTHER FATIGUE: ICD-10-CM

## 2023-03-24 LAB
ALBUMIN SERPL BCG-MCNC: 4.6 G/DL (ref 3.5–5.2)
ALP SERPL-CCNC: 56 U/L (ref 40–129)
ALT SERPL W P-5'-P-CCNC: 76 U/L (ref 10–50)
ANION GAP SERPL CALCULATED.3IONS-SCNC: 11 MMOL/L (ref 7–15)
AST SERPL W P-5'-P-CCNC: 47 U/L (ref 10–50)
BASOPHILS # BLD AUTO: 0 10E3/UL (ref 0–0.2)
BASOPHILS NFR BLD AUTO: 0 %
BILIRUB SERPL-MCNC: 0.5 MG/DL
BUN SERPL-MCNC: 18.6 MG/DL (ref 6–20)
CALCIUM SERPL-MCNC: 9.6 MG/DL (ref 8.6–10)
CHLORIDE SERPL-SCNC: 104 MMOL/L (ref 98–107)
CHOLEST SERPL-MCNC: 188 MG/DL
CREAT SERPL-MCNC: 0.89 MG/DL (ref 0.67–1.17)
DEPRECATED HCO3 PLAS-SCNC: 25 MMOL/L (ref 22–29)
EOSINOPHIL # BLD AUTO: 0.1 10E3/UL (ref 0–0.7)
EOSINOPHIL NFR BLD AUTO: 1 %
ERYTHROCYTE [DISTWIDTH] IN BLOOD BY AUTOMATED COUNT: 12.1 % (ref 10–15)
GFR SERPL CREATININE-BSD FRML MDRD: >90 ML/MIN/1.73M2
GLUCOSE SERPL-MCNC: 105 MG/DL (ref 70–99)
HCT VFR BLD AUTO: 46 % (ref 40–53)
HCV AB SERPL QL IA: NONREACTIVE
HDLC SERPL-MCNC: 103 MG/DL
HGB BLD-MCNC: 15.8 G/DL (ref 13.3–17.7)
HIV 1+2 AB+HIV1 P24 AG SERPL QL IA: NONREACTIVE
LDLC SERPL CALC-MCNC: 74 MG/DL
LYMPHOCYTES # BLD AUTO: 2.2 10E3/UL (ref 0.8–5.3)
LYMPHOCYTES NFR BLD AUTO: 38 %
MCH RBC QN AUTO: 31 PG (ref 26.5–33)
MCHC RBC AUTO-ENTMCNC: 34.3 G/DL (ref 31.5–36.5)
MCV RBC AUTO: 90 FL (ref 78–100)
MONOCYTES # BLD AUTO: 0.6 10E3/UL (ref 0–1.3)
MONOCYTES NFR BLD AUTO: 10 %
NEUTROPHILS # BLD AUTO: 3 10E3/UL (ref 1.6–8.3)
NEUTROPHILS NFR BLD AUTO: 51 %
NONHDLC SERPL-MCNC: 85 MG/DL
PLATELET # BLD AUTO: 214 10E3/UL (ref 150–450)
POTASSIUM SERPL-SCNC: 4.3 MMOL/L (ref 3.4–5.3)
PROT SERPL-MCNC: 7.4 G/DL (ref 6.4–8.3)
RBC # BLD AUTO: 5.1 10E6/UL (ref 4.4–5.9)
SODIUM SERPL-SCNC: 140 MMOL/L (ref 136–145)
TRIGL SERPL-MCNC: 53 MG/DL
TSH SERPL DL<=0.005 MIU/L-ACNC: 1.12 UIU/ML (ref 0.3–4.2)
WBC # BLD AUTO: 5.8 10E3/UL (ref 4–11)

## 2023-03-24 PROCEDURE — 86803 HEPATITIS C AB TEST: CPT

## 2023-03-24 PROCEDURE — 80050 GENERAL HEALTH PANEL: CPT

## 2023-03-24 PROCEDURE — 36415 COLL VENOUS BLD VENIPUNCTURE: CPT

## 2023-03-24 PROCEDURE — 87389 HIV-1 AG W/HIV-1&-2 AB AG IA: CPT

## 2023-03-24 PROCEDURE — 99207 PR NO CHARGE LOS: CPT | Performed by: FAMILY MEDICINE

## 2023-03-24 PROCEDURE — 80061 LIPID PANEL: CPT

## 2023-03-24 RX ORDER — CLINDAMYCIN PHOSPHATE 11.9 MG/ML
SOLUTION TOPICAL 2 TIMES DAILY
Qty: 60 ML | Refills: 3 | Status: SHIPPED | OUTPATIENT
Start: 2023-03-24

## 2024-01-23 ENCOUNTER — PATIENT OUTREACH (OUTPATIENT)
Dept: CARE COORDINATION | Facility: CLINIC | Age: 39
End: 2024-01-23

## 2024-02-06 ENCOUNTER — PATIENT OUTREACH (OUTPATIENT)
Dept: CARE COORDINATION | Facility: CLINIC | Age: 39
End: 2024-02-06

## 2024-04-07 ENCOUNTER — HEALTH MAINTENANCE LETTER (OUTPATIENT)
Age: 39
End: 2024-04-07

## 2024-05-24 ENCOUNTER — HOSPITAL ENCOUNTER (EMERGENCY)
Facility: CLINIC | Age: 39
Discharge: HOME | End: 2024-05-24
Attending: NURSE PRACTITIONER | Admitting: NURSE PRACTITIONER
Payer: COMMERCIAL

## 2024-05-24 ENCOUNTER — APPOINTMENT (OUTPATIENT)
Dept: GENERAL RADIOLOGY | Facility: CLINIC | Age: 39
End: 2024-05-24
Attending: NURSE PRACTITIONER
Payer: COMMERCIAL

## 2024-05-24 VITALS
TEMPERATURE: 97.9 F | RESPIRATION RATE: 16 BRPM | OXYGEN SATURATION: 97 % | HEART RATE: 64 BPM | SYSTOLIC BLOOD PRESSURE: 127 MMHG | DIASTOLIC BLOOD PRESSURE: 74 MMHG

## 2024-05-24 DIAGNOSIS — S93.601A SPRAIN OF RIGHT FOOT, INITIAL ENCOUNTER: ICD-10-CM

## 2024-05-24 PROCEDURE — 99283 EMERGENCY DEPT VISIT LOW MDM: CPT | Performed by: NURSE PRACTITIONER

## 2024-05-24 PROCEDURE — 73630 X-RAY EXAM OF FOOT: CPT | Mod: RT

## 2024-05-24 ASSESSMENT — COLUMBIA-SUICIDE SEVERITY RATING SCALE - C-SSRS
2. HAVE YOU ACTUALLY HAD ANY THOUGHTS OF KILLING YOURSELF IN THE PAST MONTH?: NO
1. IN THE PAST MONTH, HAVE YOU WISHED YOU WERE DEAD OR WISHED YOU COULD GO TO SLEEP AND NOT WAKE UP?: NO
6. HAVE YOU EVER DONE ANYTHING, STARTED TO DO ANYTHING, OR PREPARED TO DO ANYTHING TO END YOUR LIFE?: NO

## 2024-05-24 ASSESSMENT — ACTIVITIES OF DAILY LIVING (ADL): ADLS_ACUITY_SCORE: 35

## 2024-05-24 NOTE — ED PROVIDER NOTES
ED Provider Note  [facility]      History     Chief Complaint   Patient presents with    Foot Pain     HPI  Manpreet Lutz is a 39 year old male who presents with right foot pain, reports that he injured his foot while playing soccer 2 days ago.  Currently has swelling, pain with standing on his foot.  Denies any previous fractures or injuries to his right foot.  Reports having history of rolled ankles in his past.  Has full sensation of foot and toes.            Allergies:  No Known Allergies    Problem List:    There are no problems to display for this patient.       Past Medical History:    No past medical history on file.    Past Surgical History:    Past Surgical History:   Procedure Laterality Date    APPENDECTOMY  2000       Family History:    Family History   Problem Relation Age of Onset    Coronary Artery Disease Father 55        angioplasty    Sleep Apnea Father     Heart Disease Paternal Grandfather     Cancer No family hx of     Diabetes No family hx of        Social History:  Marital Status:   [2]  Social History     Tobacco Use    Smoking status: Former     Current packs/day: 0.00     Average packs/day: 0.3 packs/day for 8.0 years (2.0 ttl pk-yrs)     Types: Cigarettes     Start date: 2014     Quit date: 2022     Years since quittin.8    Smokeless tobacco: Never    Tobacco comments:     2 cigs daily - stress. for the past year. smoked a few years when younger and quit   Substance Use Topics    Alcohol use: Yes     Comment: occasioanlly    Drug use: Never        Medications:    clindamycin (CLEOCIN T) 1 % external solution          Review of Systems  A medically appropriate review of systems was performed with pertinent positives and negatives noted in the HPI, and all other systems negative.    Physical Exam   No data found.       Physical Exam  Musculoskeletal:      Right foot: Decreased range of motion. Normal capillary refill. Swelling,  tenderness and bony tenderness present. No deformity, bunion, Charcot foot, foot drop, prominent metatarsal heads, laceration or crepitus. Normal pulse.      Left foot: Normal.   General: alert and in no acute distress on arrival  Head: atraumatic, normocephalic  Lungs:  nonlabored  CV:  extremities warm and perfused  Skin: no rashes, no diaphoresis and skin color normal  Neuro: Patient awake, alert, speech is fluent, full gross motor function of right foot, denies any loss of sensation in foot or toes.  Psychiatric: affect/mood normal, appropriate historian.      ED Course                 Procedures                    Results for orders placed or performed during the hospital encounter of 05/24/24 (from the past 24 hour(s))   Foot  XR, G/E 3 views, right    Narrative    EXAM: XR FOOT RIGHT G/E 3 VIEWS  DATE/TIME: 5/24/2024 2:44 PM    INDICATION: right foot pain, injury two days ago  COMPARISON: 4/14/2021      Impression    IMPRESSION: Normal joint spaces and alignment. No acute fracture.       URI FITCH DO         SYSTEM ID:  [id number]       MEDICATIONS GIVEN IN THE EMERGENCY DEPARTMENT:  Medications - No data to display             Assessments & Plan (with Medical Decision Making)  39 year old male who presents to the Urgent Care for evaluation of right foot pain, diagnosis: Right foot sprain initial encounter.  X-rays negative for fracture or bone abnormalities.  Encourage patient to use ice, elevate, compression for swelling.  Anti-inflammatory medicine such as ibuprofen can be used for decrease swelling and pain.       I have reviewed the nursing notes.    I have reviewed the findings, diagnosis, plan and need for follow up with the patient.        NEW PRESCRIPTIONS STARTED AT TODAY'S ER VISIT  Discharge Medication List as of 5/24/2024  3:17 PM          Final diagnoses:   Sprain of right foot, initial encounter       5/24/2024   [facility] EMERGENCY DEPT       Rachael Holm APRN  CNP  05/24/24 1521

## 2024-05-24 NOTE — DISCHARGE INSTRUCTIONS
X-rays were negative for fractures or bony abnormalities.  Recommend icing, elevating use of Ace bandage for compression if symptoms or not improving or if they worsen despite recommended treatment plan follow-up here.

## 2024-08-20 ENCOUNTER — PATIENT OUTREACH (OUTPATIENT)
Dept: CARE COORDINATION | Facility: CLINIC | Age: 39
End: 2024-08-20
Payer: COMMERCIAL

## 2025-05-28 ENCOUNTER — ANCILLARY PROCEDURE (OUTPATIENT)
Dept: GENERAL RADIOLOGY | Facility: CLINIC | Age: 40
End: 2025-05-28
Attending: NURSE PRACTITIONER
Payer: COMMERCIAL

## 2025-05-28 ENCOUNTER — OFFICE VISIT (OUTPATIENT)
Dept: FAMILY MEDICINE | Facility: CLINIC | Age: 40
End: 2025-05-28
Payer: COMMERCIAL

## 2025-05-28 VITALS
BODY MASS INDEX: 27.93 KG/M2 | RESPIRATION RATE: 16 BRPM | OXYGEN SATURATION: 96 % | TEMPERATURE: 97.4 F | DIASTOLIC BLOOD PRESSURE: 87 MMHG | HEART RATE: 61 BPM | WEIGHT: 199.5 LBS | HEIGHT: 71 IN | SYSTOLIC BLOOD PRESSURE: 136 MMHG

## 2025-05-28 DIAGNOSIS — S29.9XXA INJURY OF CHEST WALL, INITIAL ENCOUNTER: ICD-10-CM

## 2025-05-28 DIAGNOSIS — S29.9XXA INJURY OF CHEST WALL, INITIAL ENCOUNTER: Primary | ICD-10-CM

## 2025-05-28 PROCEDURE — 1125F AMNT PAIN NOTED PAIN PRSNT: CPT | Performed by: NURSE PRACTITIONER

## 2025-05-28 PROCEDURE — 3079F DIAST BP 80-89 MM HG: CPT | Performed by: NURSE PRACTITIONER

## 2025-05-28 PROCEDURE — 99213 OFFICE O/P EST LOW 20 MIN: CPT | Performed by: NURSE PRACTITIONER

## 2025-05-28 PROCEDURE — 71101 X-RAY EXAM UNILAT RIBS/CHEST: CPT | Mod: TC | Performed by: RADIOLOGY

## 2025-05-28 PROCEDURE — 3075F SYST BP GE 130 - 139MM HG: CPT | Performed by: NURSE PRACTITIONER

## 2025-05-28 ASSESSMENT — PAIN SCALES - GENERAL: PAINLEVEL_OUTOF10: MODERATE PAIN (5)

## 2025-05-28 NOTE — PATIENT INSTRUCTIONS
Please use Tylenol 650mg every 4 hours or ibuprofen 600mg every 6 hours by mouth for pain.  Do not exceed 4000mg of acetaminophen or 2400mg of ibuprofen from any source in a 24 hour period.  Taking Tylenol and ibuprofen together may be helpful in reducing pain.    Can try lidocaine patch to the area.    Let me know if you feel like you need additional pain medication.    If you have worsening symptoms, severe pain, go to ER right away.

## 2025-05-28 NOTE — PROGRESS NOTES
"  Assessment & Plan     Injury of chest wall, initial encounter  XR negative on my review for pneumo or rib fx. Suspect contusion of chest wall. Discussed pain management strategies with ibuprofen, lidocaine patches. Offered additional pain medication which he declines at this time. He will let me know if he feels he needs further pain meds.  - XR Ribs & Chest Left G/E 3 Views; Future                Subjective   Jeffery is a 40 year old, presenting for the following health issues:  Rib Pain      5/28/2025    12:42 PM   Additional Questions   Roomed by SUMA Adkins CMA   Accompanied by Self     History of Present Illness       Reason for visit:  Sore ribs  Symptom onset:  1-2 weeks ago  Symptoms include:  Ache and pain some shalloe breathing  Symptom intensity:  Moderate  Symptom progression:  Staying the same  Had these symptoms before:  No  What makes it worse:  Excessive movement  What makes it better:  No   He is taking medications regularly.        Playing soccer one week ago, ran into [place] with his left lower anterior chest wall. Has had pain and occasional shortness of breath since that time. No cough or fever. No bruising. Did have some increased shortness of breath during his soccer game earlier this week that resolved with rest.      Review of Systems  Constitutional, HEENT, cardiovascular, pulmonary, GI, , musculoskeletal, neuro, skin, endocrine and psych systems are negative, except as otherwise noted.      Objective    /87   Pulse 61   Temp 97.4  F (36.3  C) (Oral)   Resp 16   Ht 1.797 m (5' 10.75\")   Wt 90.5 kg (199 lb 8 oz)   SpO2 96%   BMI 28.02 kg/m    Body mass index is 28.02 kg/m .  Physical Exam  Vitals and nursing note reviewed.   Constitutional:       Appearance: Normal appearance.   HENT:      Head: Normocephalic and atraumatic.      Mouth/Throat:      Mouth: Mucous membranes are moist.   Eyes:      Comments: Non-icteric   Cardiovascular:      Rate and Rhythm: Normal rate and regular " rhythm.      Pulses: Normal pulses.      Heart sounds: Normal heart sounds, S1 normal and S2 normal. Heart sounds not distant. No murmur heard.     No friction rub. No gallop.   Pulmonary:      Effort: Pulmonary effort is normal.      Breath sounds: Normal breath sounds.   Chest:      Chest wall: No mass, lacerations, deformity, swelling, tenderness, crepitus or edema.   Abdominal:      General: Abdomen is flat. Bowel sounds are normal.      Palpations: Abdomen is soft.   Musculoskeletal:      Cervical back: Neck supple.      Right lower leg: No edema.      Left lower leg: No edema.   Skin:     General: Skin is warm and dry.      Capillary Refill: Capillary refill takes less than 2 seconds.   Neurological:      General: No focal deficit present.      Mental Status: He is alert and oriented to person, place, and time.   Psychiatric:         Mood and Affect: Mood normal.         Behavior: Behavior normal.         Thought Content: Thought content normal.         Judgment: Judgment normal.            Xray - Reviewed and interpreted by me.  Left ribs - no obvious pneumo or fracture        Signed Electronically by: KALA Stanton CNP

## 2025-06-02 ENCOUNTER — OFFICE VISIT (OUTPATIENT)
Dept: FAMILY MEDICINE | Facility: CLINIC | Age: 40
End: 2025-06-02
Payer: COMMERCIAL

## 2025-06-02 VITALS
SYSTOLIC BLOOD PRESSURE: 114 MMHG | OXYGEN SATURATION: 98 % | RESPIRATION RATE: 20 BRPM | HEIGHT: 71 IN | WEIGHT: 201.6 LBS | DIASTOLIC BLOOD PRESSURE: 78 MMHG | TEMPERATURE: 98.1 F | BODY MASS INDEX: 28.22 KG/M2 | HEART RATE: 54 BPM
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
## 2025-06-02 DIAGNOSIS — S30.860A TICK BITE OF LOWER BACK, INITIAL ENCOUNTER: Primary | ICD-10-CM

## 2025-06-02 PROCEDURE — 1126F AMNT PAIN NOTED NONE PRSNT: CPT | Performed by: STUDENT IN AN ORGANIZED HEALTH CARE EDUCATION/TRAINING PROGRAM

## 2025-06-02 PROCEDURE — 3078F DIAST BP <80 MM HG: CPT | Performed by: STUDENT IN AN ORGANIZED HEALTH CARE EDUCATION/TRAINING PROGRAM

## 2025-06-02 PROCEDURE — 3074F SYST BP LT 130 MM HG: CPT | Performed by: STUDENT IN AN ORGANIZED HEALTH CARE EDUCATION/TRAINING PROGRAM

## 2025-06-02 PROCEDURE — 99214 OFFICE O/P EST MOD 30 MIN: CPT | Performed by: STUDENT IN AN ORGANIZED HEALTH CARE EDUCATION/TRAINING PROGRAM

## 2025-06-02 RX ORDER — DOXYCYCLINE 100 MG/1
100 CAPSULE ORAL 2 TIMES DAILY
Qty: 20 CAPSULE | Refills: 0 | Status: SHIPPED | OUTPATIENT
Start: 2025-06-02 | End: 2025-06-12

## 2025-06-02 ASSESSMENT — PAIN SCALES - GENERAL: PAINLEVEL_OUTOF10: NO PAIN (0)

## 2025-06-02 NOTE — PROGRESS NOTES
"  Assessment & Plan     Tick bite of lower back, initial encounter  Unstable.The site appears irritated, though there are no classic signs of erythema migrans. Removal of the tick head was notably difficult, and the duration of attachment is unknown. Given these factors, we will proceed with a 10-day course of antibiotics.  - doxycycline hyclate (VIBRAMYCIN) 100 MG capsule; Take 1 capsule (100 mg) by mouth 2 times daily for 10 days.  Side effects of medication discussed with patient.          31 minutes spent by me on the date of the encounter doing chart review, patient visit, and documentation       Subjective   Jeffery is a 40 year old, presenting for the following health issues:  Tick Bite        6/2/2025    11:12 AM   Additional Questions   Roomed by Melissa   Accompanied by n/a     HPI        Tick Bite         Date of Bite:         Location:         Was tick removed entirely? No         Symptoms since bite?         Have you noticed a red ring? No, but the area is red        Review of Systems  Constitutional, HEENT, cardiovascular, pulmonary, gi and gu systems are negative, except as otherwise noted.      Objective    /78   Pulse 54   Temp 98.1  F (36.7  C) (Temporal)   Resp 20   Ht 1.804 m (5' 11.02\")   Wt 91.4 kg (201 lb 9.6 oz)   SpO2 98%   BMI 28.10 kg/m    Body mass index is 28.1 kg/m .  Physical Exam   GENERAL: alert and no distress  SKIN: There is an erythematous patch located on the mid-back, positioned laterally on the left side.          Signed Electronically by: Breanne Davey MD    "

## 2025-06-02 NOTE — PATIENT INSTRUCTIONS
Aleksandr Gil,    Thank you for allowing [facility] to manage your care.        I sent your prescriptions to your pharmacy.      For your convenience, test results are released as soon as they are available  Please allow 1-2 business days for me to send you a comment about your results.  If not done so, I encourage you to login into [email or web address] [email or web address] to review your results in real time.     If you have any questions or concerns, please feel free to call us at [phone].    Sincerely,    Dr. Davey    Did you know?      You can schedule a video visit for follow-up appointments as well as future appointments for certain conditions.  Please see the below link.     [email or web address]    If you have not already done so,  I encourage you to sign up for [email or web address]).  This will allow you to review your results, securely communicate with a provider, and schedule virtual visits as well.